# Patient Record
Sex: MALE | Race: WHITE | URBAN - METROPOLITAN AREA
[De-identification: names, ages, dates, MRNs, and addresses within clinical notes are randomized per-mention and may not be internally consistent; named-entity substitution may affect disease eponyms.]

---

## 2017-07-25 ENCOUNTER — OFFICE VISIT (OUTPATIENT)
Dept: FAMILY MEDICINE | Facility: CLINIC | Age: 32
End: 2017-07-25

## 2017-07-25 VITALS
OXYGEN SATURATION: 99 % | DIASTOLIC BLOOD PRESSURE: 73 MMHG | RESPIRATION RATE: 18 BRPM | WEIGHT: 155.2 LBS | TEMPERATURE: 98.1 F | SYSTOLIC BLOOD PRESSURE: 118 MMHG | HEART RATE: 56 BPM | BODY MASS INDEX: 27.5 KG/M2 | HEIGHT: 63 IN

## 2017-07-25 DIAGNOSIS — Z01.818 PREOP GENERAL PHYSICAL EXAM: Primary | ICD-10-CM

## 2017-07-25 DIAGNOSIS — M25.562 CHRONIC PAIN OF LEFT KNEE: ICD-10-CM

## 2017-07-25 DIAGNOSIS — G89.29 CHRONIC PAIN OF LEFT KNEE: ICD-10-CM

## 2017-07-25 NOTE — PATIENT INSTRUCTIONS
Before Your Surgery      Call your surgeon if there is any change in your health. This includes signs of a cold or flu (such as a sore throat, runny nose, cough, rash or fever).    Do not smoke, drink alcohol or take over the counter medicine (unless your surgeon or primary care doctor tells you to) for the 24 hours before and after surgery.    If you take prescribed drugs: Follow your doctor s orders about which medicines to take and which to stop until after surgery.    Eating and drinking prior to surgery: follow the instructions from your surgeon    Take a shower or bath the night before surgery. Use the soap your surgeon gave you to gently clean your skin. If you do not have soap from your surgeon, use your regular soap. Do not shave or scrub the surgery site.  Wear clean pajamas and have clean sheets on your bed.     Pre-op faxed to fax number :  [phone]   [facility] :  [facility]  Date of Surgery :  8/1/17  By/Date :  JOVON Cobb 7/28/17
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
## 2017-07-25 NOTE — MR AVS SNAPSHOT
After Visit Summary   7/25/2017    Brandyn Gardner    MRN: [id number]           Patient Information     Date Of Birth          1985        Visit Information        Provider Department      7/25/2017 9:20 AM Ronald Gould MD [facility]        Today's Diagnoses     Preop general physical exam    -  1    Chronic pain of left knee          Care Instructions      Before Your Surgery      Call your surgeon if there is any change in your health. This includes signs of a cold or flu (such as a sore throat, runny nose, cough, rash or fever).    Do not smoke, drink alcohol or take over the counter medicine (unless your surgeon or primary care doctor tells you to) for the 24 hours before and after surgery.    If you take prescribed drugs: Follow your doctor s orders about which medicines to take and which to stop until after surgery.    Eating and drinking prior to surgery: follow the instructions from your surgeon    Take a shower or bath the night before surgery. Use the soap your surgeon gave you to gently clean your skin. If you do not have soap from your surgeon, use your regular soap. Do not shave or scrub the surgery site.  Wear clean pajamas and have clean sheets on your bed.           Follow-ups after your visit        Who to contact     Please call your clinic at [phone] to:    Ask questions about your health    Make or cancel appointments    Discuss your medicines    Learn about your test results    Speak to your doctor   If you have compliments or concerns about an experience at your clinic, or if you wish to file a complaint, please contact [facility] Patient Relations at [phone] or email us at [email or web address]         Additional Information About Your Visit        Care EveryWhere ID     This is your Care EveryWhere ID. This could be used by other organizations to access your [facility] medical records  [id number]        Your  "Vitals Were     Pulse Temperature Respirations Height Pulse Oximetry BMI (Body Mass Index)    56 98.1  F (36.7  C) (Oral) 18 5' 2.75\" (159.4 cm) 99% 27.71 kg/m2       Blood Pressure from Last 3 Encounters:   07/25/17 118/73    Weight from Last 3 Encounters:   07/25/17 155 lb 3.2 oz (70.4 kg)              Today, you had the following     No orders found for display       Primary Care Provider Office Phone # Fax #    Adrian Collins [phone]       [facility] [address] [place] [address]        Equal Access to Services     JOHNSON WHITE : Hadii miguel small hadasho Soomaali, waaxda luqadaha, qaybta kaalmada adeegyada, cristiana odenn bryson acuna . So [facility] [phone].    ATENCIÓN: Si habla español, tiene a aguilera disposición servicios gratuitos de asistencia lingüística. Llame al [phone].    We comply with applicable federal civil rights laws and [place] laws. We do not discriminate on the basis of race, color, national origin, age, disability sex, sexual orientation or gender identity.            Thank you!     Thank you for choosing [facility]  for your care. Our goal is always to provide you with excellent care. Hearing back from our patients is one way we can continue to improve our services. Please take a few minutes to complete the written survey that you may receive in the mail after your visit with us. Thank you!             Your Updated Medication List - Protect others around you: Learn how to safely use, store and throw away your medicines at www.disposemymeds.org.      Notice  As of 7/25/2017 10:56 AM    You have not been prescribed any medications.      "

## 2017-07-28 ENCOUNTER — TELEPHONE (OUTPATIENT)
Dept: FAMILY MEDICINE | Facility: CLINIC | Age: 32
End: 2017-07-28

## 2017-09-29 ENCOUNTER — OFFICE VISIT (OUTPATIENT)
Dept: FAMILY MEDICINE | Facility: CLINIC | Age: 32
End: 2017-09-29

## 2017-09-29 VITALS
RESPIRATION RATE: 20 BRPM | BODY MASS INDEX: 26.61 KG/M2 | SYSTOLIC BLOOD PRESSURE: 125 MMHG | HEART RATE: 64 BPM | WEIGHT: 150.2 LBS | DIASTOLIC BLOOD PRESSURE: 76 MMHG | HEIGHT: 63 IN | OXYGEN SATURATION: 96 % | TEMPERATURE: 98.2 F

## 2017-09-29 DIAGNOSIS — G89.29 CHRONIC PAIN OF LEFT KNEE: ICD-10-CM

## 2017-09-29 DIAGNOSIS — M25.562 CHRONIC PAIN OF LEFT KNEE: ICD-10-CM

## 2017-09-29 DIAGNOSIS — Z23 IMMUNIZATION DUE: ICD-10-CM

## 2017-09-29 DIAGNOSIS — Z01.818 PRE-OPERATIVE EXAMINATION: Primary | ICD-10-CM
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
## 2017-09-29 NOTE — PROGRESS NOTES
Preceptor Attestation:  Patient's case reviewed and discussed with Alistair Townsend MD resident and I evaluated the patient. I agree with written assessment and plan of care.  Supervising Physician:  HALLEY PATEL MD  [facility]

## 2017-09-29 NOTE — NURSING NOTE
Declined flu shot but patient agreed to Tdap        name: Alejandra Templeton  Language: Icelandic  Agency: [facility]  Phone number: [phone]

## 2017-10-16 ENCOUNTER — OFFICE VISIT (OUTPATIENT)
Dept: FAMILY MEDICINE | Facility: CLINIC | Age: 32
End: 2017-10-16

## 2017-10-16 VITALS
HEIGHT: 63 IN | DIASTOLIC BLOOD PRESSURE: 74 MMHG | TEMPERATURE: 98.4 F | HEART RATE: 62 BPM | WEIGHT: 151.2 LBS | SYSTOLIC BLOOD PRESSURE: 120 MMHG | BODY MASS INDEX: 26.79 KG/M2 | OXYGEN SATURATION: 98 %

## 2017-10-16 DIAGNOSIS — Z98.890 S/P ARTHROSCOPIC SURGERY OF LEFT KNEE: ICD-10-CM

## 2017-10-16 DIAGNOSIS — Z48.89 POSTOPERATIVE VISIT: Primary | ICD-10-CM

## 2017-10-16 RX ORDER — HYDROCODONE BITARTRATE AND ACETAMINOPHEN 5; 325 MG/1; MG/1
1-2 TABLET ORAL EVERY 4 HOURS PRN
COMMUNITY
Start: 2017-10-03

## 2017-10-16 NOTE — MR AVS SNAPSHOT
"              After Visit Summary   10/16/2017    Brandyn Gardner    MRN: [id number]           Patient Information     Date Of Birth          1985        Visit Information        Provider Department      10/16/2017 9:20 AM Elise Dhaliwal MD [facility]         Follow-ups after your visit        Who to contact     Please call your clinic at [phone] to:    Ask questions about your health    Make or cancel appointments    Discuss your medicines    Learn about your test results    Speak to your doctor   If you have compliments or concerns about an experience at your clinic, or if you wish to file a complaint, please contact [facility] Patient Relations at [phone] or email us at [email or web address]         Additional Information About Your Visit        Care EveryWhere ID     This is your Care EveryWhere ID. This could be used by other organizations to access your [facility] medical records  [id number]        Your Vitals Were     Pulse Temperature Height Pulse Oximetry BMI (Body Mass Index)       62 98.4  F (36.9  C) (Oral) 5' 2.75\" (159.4 cm) 98% 27 kg/m2        Blood Pressure from Last 3 Encounters:   10/16/17 120/74   09/29/17 125/76   07/25/17 118/73    Weight from Last 3 Encounters:   10/16/17 151 lb 3.2 oz (68.6 kg)   09/29/17 150 lb 3.2 oz (68.1 kg)   07/25/17 155 lb 3.2 oz (70.4 kg)              Today, you had the following     No orders found for display       Primary Care Provider Office Phone # Fax #    Adrian Collins [phone]       [facility] [address] [place] [address]        Equal Access to Services     [facility]: Hadii miguel daniel Sogladys, waaxda luqadaha, qaybta kaalmada adewilfredoyada, cristiana mitchell. So [facility] [phone].    ATENCIÓN: Si habla español, tiene a aguilera disposición servicios gratuitos de asistencia lingüística. Llame al [phone].    We " comply with applicable federal civil rights laws and [place] laws. We do not discriminate on the basis of race, color, national origin, age, disability, sex, sexual orientation, or gender identity.            Thank you!     Thank you for choosing [facility]  for your care. Our goal is always to provide you with excellent care. Hearing back from our patients is one way we can continue to improve our services. Please take a few minutes to complete the written survey that you may receive in the mail after your visit with us. Thank you!             Your Updated Medication List - Protect others around you: Learn how to safely use, store and throw away your medicines at www.disposemymeds.org.      Notice  As of 10/16/2017 10:14 AM    You have not been prescribed any medications.

## 2017-10-16 NOTE — NURSING NOTE
Offer flu vaccine--pt decline   name: Vita Luu  Language: Croatian  Agency: [facility]  Phone number: [phone]

## 2017-10-16 NOTE — PROGRESS NOTES
Preceptor Attestation:  Patient's case reviewed and discussed with Elise Dhaliwal MD Patient seen and discussed with the resident.. I agree with assessment and plan of care.  Supervising Physician:  Hung Painting MD  [facility]

## 2017-10-16 NOTE — PROGRESS NOTES
"       HPI:       Brandyn Gardner is a 32 year old male without significant PMH who presents for follow up of concern(s) listed below    F/u left knee surgery:  -pt had left knee arthroscopy on 10/3/17  -pt brought discharge papers with him today - looks like surgery was with Gianluca melendez ([phone])  -pt reports he does not have f/u appointment scheduled with ortho - he called but no one could speak Yakut so he was unable to make a visit - pt states he needs help with this  -having some throbbing pain around left knee, no obvious trigger for this, states he has been using norco PRN for this and it is helpful, pain lasts for 15-20 minutes  -pt states he has been elevating knee and using ice PRN  -he has been walking on leg but it is tender  -no fevers/chills; has not noticed any purulent discharge, no bleeding  -sounds like pt hasn't removed bandages since surgery  -pt worried about stitches needing to be removed    A  was used for this visit.          PMHX:     Past Medical History:   Diagnosis Date     NO ACTIVE PROBLEMS      Current Outpatient Prescriptions   Medication Sig Dispense Refill     HYDROcodone-acetaminophen (NORCO) 5-325 MG per tablet Take 1-2 tablets by mouth every 4 hours as needed        No Known Allergies         Review of Systems:   ROS negative except as noted above          Physical Exam:     Vitals:    10/16/17 0929   BP: 120/74   Pulse: 62   Temp: 98.4  F (36.9  C)   TempSrc: Oral   SpO2: 98%   Weight: 151 lb 3.2 oz (68.6 kg)   Height: 5' 2.75\" (159.4 cm)     Body mass index is 27 kg/(m^2).    General appearance: alert, NAD  HEENT: atraumatic, normocephalic, no scleral icterus or injection, moist mucous membranes  Left knee: bandage in place - pt removed today for exam, steri strips still in place, no erythema/induration, sites appear clean/dry/intact (did not remove steri strips), no discharge or bleeding, no significant pain on exam  Skin: no rashes or " lesions  Neuro: alert, oriented x3, CNs grossly intact, no focal deficits appreciated  Psych: normal mood/affect/behavior, answering questions appropriately via     Assessment and Plan     Postoperative visit s/p arthroscopic surgery of left knee: pt overall appears to be healing well after surgery; no signs of infection. Pt needs help setting up f/u with ortho -  Patricia assisted with this today. Pt wondering about procedure and what was done and if it went well - will defer these questions to ortho. Did review op note in Care Everywhere, states:   PROCEDURE:  1. Left knee arthroscopic partial lateral meniscectomy.  2. Left knee arthroscopic subtotal synovectomy.  3. Left knee arthroscopic lateral retinacular release.   4. Left knee arthroscopic microfracture trochlear groove and posterior lateral femoral condyle.  5. Left shoulder injection subacromial space with lidocaine and Depo-Medrol.  POSTOPERATIVE DIAGNOSIS:  1. Left knee complex lateral meniscus tear involving the anterior, middle and posterior thirds, grade III chondromalacia posterolateral femoral condyle, grade IV chondromalacia middle trochlear groove, grade II chondromalacia middle patellar facet and synovitis.   2. Left shoulder impingement syndrome.  No complications noted on op note.  F/u in clinic PRN - discussed workability note needs to be through ortho      Options for treatment and follow-up care were reviewed with the patient and/or guardian. Brandyn Gardner and/or guardian engaged in the decision making process and verbalized understanding of the options discussed and agreed with the final plan.    Elise Dhaliwal MD      Precepted today with: Hugn Painting MD

## 2017-10-16 NOTE — LETTER
RETURN TO WORK/SCHOOL FORM    10/16/2017    Re: Brandyn Gardner  1985      To Whom It May Concern:     Brandyn Gardner was seen in clinic today. He may return to work without restrictions on 10/17/17.         Restrictions:  None.      Elise Dhaliwal MD  10/16/2017 9:34 AM
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
